# Patient Record
Sex: MALE | Race: WHITE | NOT HISPANIC OR LATINO | Employment: OTHER | URBAN - METROPOLITAN AREA
[De-identification: names, ages, dates, MRNs, and addresses within clinical notes are randomized per-mention and may not be internally consistent; named-entity substitution may affect disease eponyms.]

---

## 2020-06-17 ENCOUNTER — HISTORICAL (OUTPATIENT)
Dept: RADIOLOGY | Facility: HOSPITAL | Age: 65
End: 2020-06-17

## 2023-07-11 NOTE — PROGRESS NOTES
[facility] Vascular - Clinic Note  Blanca Reddy MD      Patient Name: Mike Quintero                   : 1955      MRN: [id number]   Visit Date: 2023       History Present Illness     Reason for Visit: Aortic Aneurysm      Mr. Quintero presents to the clinic for 6 month follow for his abdominal aortic aneurysm. Abdominal duplex obtained today. He denies acute onset of abdominal or back pain. He has chronic low back pain that is unchanged. He denies claudication with ambulation.  No recent health changes or hospitalizations.      REVIEW OF SYSTEMS:  ROS  12 point review of systems conducted, negative except as stated in the history of present illness. See HPI for details.        Physical Exam      Visit Vitals  BP (!) 159/91 (BP Location: Right arm)   Pulse 71   Wt 77.1 kg (170 lb)         General: well-nourished, no acute distress, and healthy appearing, ambulating normally, alert, pleasant, conversant, and oriented thin, healthy appearing  Neurologic: cranial nerves are grossly intact, no neurologic deficits  HEENT: grossly normal and no scleral icterus  Neck/Chest: normal  and soft without lymphadenopathy  Respiratory: breathing easily and without respiratory distress  Abdomen: normal, soft, and palpable pulsatile mass  Cardiology: regular rate and rhythm    Upper Extremity Arterial Exam:   Right - radial is palpable  Left - radial is palpable    Lower Extremity Arterial Exam:  Right - posterior tibial is palpable and dorsalis pedis is non-palpable  Left - posterior tibial is palpable and dorsalis pedis is palpable    Musculoskeletal:   Lower Extremity: no edema present to bilateral lower extremities    Skin: has no obvious skin abnormality to upper extremities and lower extremities              Assessment and Plan     Mr. Quintero is a 67 y.o. with an abdominal aortic aneurysm which appears larger than previous visit..  He denies acute back or abdominal pain. His previous aorta duplex obtained  1/10/23 demonstrates a  4.8 cm AAA. Duplex obtained today reveals slight increase in size of his aneurysm measuring approximately 5.2 cm . Recommend obtaining CTA abdomen/pelvis to further evaluate the interval change in size of his aneurysm. Based on previous imaging he does appear to be suitable for endovascular aortic aneurysm repair. We briefly discussed options for repair if needed.         1. Infrarenal abdominal aortic aneurysm (AAA) without rupture  - CTA Abdomen and Pelvis; Future  - Creatinine, serum; Future           Imaging Obtained/Reviewed   Study: aorta duplex  Date:   23        Medical History     Past Medical History:   Diagnosis Date    Hypertension     Larynx cancer      Past Surgical History:   Procedure Laterality Date    WRIST SURGERY Right      Family History   Family history unknown: Yes     Social History     Socioeconomic History    Marital status:    Tobacco Use    Smoking status: Former     Types: Cigarettes     Quit date:      Years since quittin.5    Smokeless tobacco: Never     Current Outpatient Medications   Medication Instructions    ascorbic acid (vitamin C) (VITAMIN C) 1,000 mg, Oral, Daily    zinc sulfate (ZINC-220) 220 mg, Oral, Daily     Review of patient's allergies indicates:  No Known Allergies    Patient Care Team:  Srinivas Moore MD as PCP - General (Family Medicine)  Art Mims MD as Consulting Physician (Cardiovascular Disease)        No follow-ups on file. In addition to their scheduled follow up, the patient has also been instructed to follow up on as needed basis.     Future Appointments   Date Time Provider Department Center   2023  8:00 AM [facility] CT1 500 LB LIMIT [facility]   2023  9:00 AM Blanca Redyd MD [facility]

## 2023-07-18 ENCOUNTER — OFFICE VISIT (OUTPATIENT)
Dept: VASCULAR SURGERY | Facility: CLINIC | Age: 68
End: 2023-07-18
Payer: COMMERCIAL

## 2023-07-18 VITALS — SYSTOLIC BLOOD PRESSURE: 159 MMHG | HEART RATE: 71 BPM | DIASTOLIC BLOOD PRESSURE: 91 MMHG | WEIGHT: 170 LBS

## 2023-07-18 DIAGNOSIS — I71.43 INFRARENAL ABDOMINAL AORTIC ANEURYSM (AAA) WITHOUT RUPTURE: Primary | ICD-10-CM

## 2023-07-18 PROCEDURE — 1159F PR MEDICATION LIST DOCUMENTED IN MEDICAL RECORD: ICD-10-PCS | Mod: CPTII,,, | Performed by: SPECIALIST

## 2023-07-18 PROCEDURE — 1101F PT FALLS ASSESS-DOCD LE1/YR: CPT | Mod: CPTII,,, | Performed by: SPECIALIST

## 2023-07-18 PROCEDURE — 3288F FALL RISK ASSESSMENT DOCD: CPT | Mod: CPTII,,, | Performed by: SPECIALIST

## 2023-07-18 PROCEDURE — 1101F PR PT FALLS ASSESS DOC 0-1 FALLS W/OUT INJ PAST YR: ICD-10-PCS | Mod: CPTII,,, | Performed by: SPECIALIST

## 2023-07-18 PROCEDURE — 99213 PR OFFICE/OUTPT VISIT, EST, LEVL III, 20-29 MIN: ICD-10-PCS | Mod: ,,, | Performed by: SPECIALIST

## 2023-07-18 PROCEDURE — 1159F MED LIST DOCD IN RCRD: CPT | Mod: CPTII,,, | Performed by: SPECIALIST

## 2023-07-18 PROCEDURE — 99213 OFFICE O/P EST LOW 20 MIN: CPT | Mod: ,,, | Performed by: SPECIALIST

## 2023-07-18 PROCEDURE — 3288F PR FALLS RISK ASSESSMENT DOCUMENTED: ICD-10-PCS | Mod: CPTII,,, | Performed by: SPECIALIST

## 2023-07-18 PROCEDURE — 3080F PR MOST RECENT DIASTOLIC BLOOD PRESSURE >= 90 MM HG: ICD-10-PCS | Mod: CPTII,,, | Performed by: SPECIALIST

## 2023-07-18 PROCEDURE — 1160F PR REVIEW ALL MEDS BY PRESCRIBER/CLIN PHARMACIST DOCUMENTED: ICD-10-PCS | Mod: CPTII,,, | Performed by: SPECIALIST

## 2023-07-18 PROCEDURE — 3077F PR MOST RECENT SYSTOLIC BLOOD PRESSURE >= 140 MM HG: ICD-10-PCS | Mod: CPTII,,, | Performed by: SPECIALIST

## 2023-07-18 PROCEDURE — 3080F DIAST BP >= 90 MM HG: CPT | Mod: CPTII,,, | Performed by: SPECIALIST

## 2023-07-18 PROCEDURE — 3077F SYST BP >= 140 MM HG: CPT | Mod: CPTII,,, | Performed by: SPECIALIST

## 2023-07-18 PROCEDURE — 1160F RVW MEDS BY RX/DR IN RCRD: CPT | Mod: CPTII,,, | Performed by: SPECIALIST

## 2023-07-18 RX ORDER — ZINC SULFATE 50(220)MG
220 CAPSULE ORAL DAILY
COMMUNITY

## 2023-07-18 RX ORDER — IBUPROFEN 100 MG/5ML
1000 SUSPENSION, ORAL (FINAL DOSE FORM) ORAL DAILY
COMMUNITY

## 2023-07-31 NOTE — PROGRESS NOTES
[facility] Vascular - Clinic Note  Blanca Reddy MD      Patient Name: Mike Quintero                   : 1955      MRN: [id number]   Visit Date: 2023       History Present Illness     Reason for Visit: Aortic Aneurysm      Mr. Quintero presents to the clinic for 2 week follow to discuss recent CTA images secondary to increase size of his abdominal aortic aneurysm on recent ultrasound. He continues to deny acute onset of abdominal or back pain. He has chronic low back pain that is unchanged. He denies claudication with ambulation.  No recent health changes or hospitalizations.   He reports some issues with a right sided radiculopathy. Denies previous abdominal surgery.       REVIEW OF SYSTEMS:  ROS  12 point review of systems conducted, negative except as stated in the history of present illness. See HPI for details.        Physical Exam      Visit Vitals  BP (!) 155/88 (BP Location: Right arm)   Pulse 87           General: well-nourished, no acute distress, and healthy appearing, ambulating normally, alert, pleasant, conversant, and oriented thin, healthy appearing  Neurologic: cranial nerves are grossly intact, no neurologic deficits  HEENT: grossly normal and no scleral icterus  Neck/Chest: normal  and soft without lymphadenopathy  Respiratory: breathing easily and without respiratory distress  Abdomen: normal, soft, and palpable pulsatile mass  Cardiology: regular rate and rhythm    Upper Extremity Arterial Exam:   Right - radial is palpable  Left - radial is palpable    Lower Extremity Arterial Exam:  Right - posterior tibial is palpable and dorsalis pedis is non-palpable  Left - posterior tibial is palpable and dorsalis pedis is palpable    Musculoskeletal:   Lower Extremity: no edema present to bilateral lower extremities    Skin: has no obvious skin abnormality to upper extremities and lower extremities              Assessment and Plan     Mr. Quintero is a 67 y.o. with a recent increase in  size of known abdominal aortic aneurysm on ultrasound testing. He denies acute back or abdominal pain.I personally reviewed the images from his recent CTA abdomen/pelvis obtained 23, which demonstrates an approximately 5.5 cm infrarenal AAA on AP measurement on transverse cuts (5.3 cm measured  on centerline) . I recommend proceeding with ENDOVASCULAR AORTIC ANEURYSM REPAIR. We discussed, in detail, surgical options as well as the risks and benefits of the procedure including myocardial infarction, bleeding, infection, renal failure, arterial injury, limb ischemia, conversion to open repair, and/or death. We discussed the risk of need for further procedures associated with endoleak. He wishes to proceed.        1. Infrarenal abdominal aortic aneurysm (AAA) without rupture             Imaging Obtained/Reviewed   Study: aorta duplex  Date:   23        Medical History     Past Medical History:   Diagnosis Date    Hypertension     Larynx cancer      Past Surgical History:   Procedure Laterality Date    WRIST SURGERY Right      Family History   Family history unknown: Yes     Social History     Socioeconomic History    Marital status:    Tobacco Use    Smoking status: Former     Current packs/day: 0.00     Types: Cigarettes     Quit date:      Years since quittin.6    Smokeless tobacco: Never     Current Outpatient Medications   Medication Instructions    ascorbic acid (vitamin C) (VITAMIN C) 1,000 mg, Oral, Daily    zinc sulfate (ZINC-220) 220 mg, Oral, Daily     Review of patient's allergies indicates:  No Known Allergies    Patient Care Team:  Srinivas Moore MD as PCP - General (Family Medicine)  Art Mims MD as Consulting Physician (Cardiovascular Disease)        No follow-ups on file. In addition to their scheduled follow up, the patient has also been instructed to follow up on as needed basis.     No future appointments.

## 2023-08-01 ENCOUNTER — OFFICE VISIT (OUTPATIENT)
Dept: VASCULAR SURGERY | Facility: CLINIC | Age: 68
End: 2023-08-01
Payer: COMMERCIAL

## 2023-08-01 ENCOUNTER — HOSPITAL ENCOUNTER (OUTPATIENT)
Dept: RADIOLOGY | Facility: HOSPITAL | Age: 68
Discharge: HOME | End: 2023-08-01
Attending: SPECIALIST
Payer: COMMERCIAL

## 2023-08-01 VITALS — SYSTOLIC BLOOD PRESSURE: 155 MMHG | HEART RATE: 87 BPM | DIASTOLIC BLOOD PRESSURE: 88 MMHG

## 2023-08-01 DIAGNOSIS — I71.43 INFRARENAL ABDOMINAL AORTIC ANEURYSM (AAA) WITHOUT RUPTURE: ICD-10-CM

## 2023-08-01 DIAGNOSIS — I71.43 INFRARENAL ABDOMINAL AORTIC ANEURYSM (AAA) WITHOUT RUPTURE: Primary | ICD-10-CM

## 2023-08-01 PROCEDURE — 3079F DIAST BP 80-89 MM HG: CPT | Mod: CPTII,,, | Performed by: SPECIALIST

## 2023-08-01 PROCEDURE — 3079F PR MOST RECENT DIASTOLIC BLOOD PRESSURE 80-89 MM HG: ICD-10-PCS | Mod: CPTII,,, | Performed by: SPECIALIST

## 2023-08-01 PROCEDURE — 99214 PR OFFICE/OUTPT VISIT, EST, LEVL IV, 30-39 MIN: ICD-10-PCS | Mod: ,,, | Performed by: SPECIALIST

## 2023-08-01 PROCEDURE — 1101F PT FALLS ASSESS-DOCD LE1/YR: CPT | Mod: CPTII,,, | Performed by: SPECIALIST

## 2023-08-01 PROCEDURE — 1159F PR MEDICATION LIST DOCUMENTED IN MEDICAL RECORD: ICD-10-PCS | Mod: CPTII,,, | Performed by: SPECIALIST

## 2023-08-01 PROCEDURE — 99214 OFFICE O/P EST MOD 30 MIN: CPT | Mod: ,,, | Performed by: SPECIALIST

## 2023-08-01 PROCEDURE — 1101F PR PT FALLS ASSESS DOC 0-1 FALLS W/OUT INJ PAST YR: ICD-10-PCS | Mod: CPTII,,, | Performed by: SPECIALIST

## 2023-08-01 PROCEDURE — 3077F SYST BP >= 140 MM HG: CPT | Mod: CPTII,,, | Performed by: SPECIALIST

## 2023-08-01 PROCEDURE — 1160F PR REVIEW ALL MEDS BY PRESCRIBER/CLIN PHARMACIST DOCUMENTED: ICD-10-PCS | Mod: CPTII,,, | Performed by: SPECIALIST

## 2023-08-01 PROCEDURE — 3077F PR MOST RECENT SYSTOLIC BLOOD PRESSURE >= 140 MM HG: ICD-10-PCS | Mod: CPTII,,, | Performed by: SPECIALIST

## 2023-08-01 PROCEDURE — 3288F PR FALLS RISK ASSESSMENT DOCUMENTED: ICD-10-PCS | Mod: CPTII,,, | Performed by: SPECIALIST

## 2023-08-01 PROCEDURE — 3288F FALL RISK ASSESSMENT DOCD: CPT | Mod: CPTII,,, | Performed by: SPECIALIST

## 2023-08-01 PROCEDURE — 1160F RVW MEDS BY RX/DR IN RCRD: CPT | Mod: CPTII,,, | Performed by: SPECIALIST

## 2023-08-01 PROCEDURE — 71046 X-RAY EXAM CHEST 2 VIEWS: CPT | Mod: TC

## 2023-08-01 PROCEDURE — 1159F MED LIST DOCD IN RCRD: CPT | Mod: CPTII,,, | Performed by: SPECIALIST

## 2023-08-02 RX ORDER — MUPIROCIN 20 MG/G
OINTMENT TOPICAL
Status: CANCELLED | OUTPATIENT
Start: 2023-08-02

## 2023-08-02 RX ORDER — LIDOCAINE HYDROCHLORIDE 10 MG/ML
1 INJECTION, SOLUTION EPIDURAL; INFILTRATION; INTRACAUDAL; PERINEURAL ONCE
Status: CANCELLED | OUTPATIENT
Start: 2023-08-02 | End: 2023-08-02

## 2023-08-02 RX ORDER — SODIUM CHLORIDE 9 MG/ML
INJECTION, SOLUTION INTRAVENOUS CONTINUOUS
Status: CANCELLED | OUTPATIENT
Start: 2023-08-02

## 2023-08-03 RX ORDER — HYDROCODONE BITARTRATE AND ACETAMINOPHEN 7.5; 325 MG/1; MG/1
1 TABLET ORAL EVERY 6 HOURS PRN
Qty: 28 TABLET | Refills: 0 | Status: SHIPPED | OUTPATIENT
Start: 2023-08-03 | End: 2023-09-05

## 2023-08-18 ENCOUNTER — TELEPHONE (OUTPATIENT)
Dept: VASCULAR SURGERY | Facility: CLINIC | Age: 68
End: 2023-08-18
Payer: COMMERCIAL

## 2023-08-18 NOTE — TELEPHONE ENCOUNTER
Mr. Quintero did undergo PET stress testing earlier today.  I did speak to Dr. Mims and there was significant concern from that test.  We will cancel his EVAR which was scheduled for this Monday and he will proceed with coronary angiogram with Dr. Mims.  We will await those findings and determine suitable time for future aneurysm repair.

## 2023-08-24 ENCOUNTER — HOSPITAL ENCOUNTER (OUTPATIENT)
Facility: HOSPITAL | Age: 68
Discharge: HOME | End: 2023-08-24
Attending: INTERNAL MEDICINE | Admitting: INTERNAL MEDICINE
Payer: COMMERCIAL

## 2023-08-24 VITALS
RESPIRATION RATE: 15 BRPM | OXYGEN SATURATION: 97 % | HEART RATE: 55 BPM | WEIGHT: 172.38 LBS | SYSTOLIC BLOOD PRESSURE: 123 MMHG | HEIGHT: 70 IN | BODY MASS INDEX: 24.68 KG/M2 | DIASTOLIC BLOOD PRESSURE: 78 MMHG | TEMPERATURE: 98 F

## 2023-08-24 DIAGNOSIS — R94.8 ABNORMAL PET SCAN, MEDIASTINUM: ICD-10-CM

## 2023-08-24 PROCEDURE — 25500020 PHARM REV CODE 255: Performed by: INTERNAL MEDICINE

## 2023-08-24 PROCEDURE — C1769 GUIDE WIRE: HCPCS | Performed by: INTERNAL MEDICINE

## 2023-08-24 PROCEDURE — 27201423 OPTIME MED/SURG SUP & DEVICES STERILE SUPPLY: Performed by: INTERNAL MEDICINE

## 2023-08-24 PROCEDURE — 99152 MOD SED SAME PHYS/QHP 5/>YRS: CPT | Performed by: INTERNAL MEDICINE

## 2023-08-24 PROCEDURE — C1887 CATHETER, GUIDING: HCPCS | Performed by: INTERNAL MEDICINE

## 2023-08-24 PROCEDURE — 93459 L HRT ART/GRFT ANGIO: CPT | Performed by: INTERNAL MEDICINE

## 2023-08-24 PROCEDURE — 63600175 PHARM REV CODE 636 W HCPCS: Performed by: INTERNAL MEDICINE

## 2023-08-24 PROCEDURE — 25000003 PHARM REV CODE 250: Performed by: INTERNAL MEDICINE

## 2023-08-24 RX ORDER — DIPHENHYDRAMINE HCL 25 MG
50 CAPSULE ORAL
Status: DISCONTINUED | OUTPATIENT
Start: 2023-08-24 | End: 2023-08-24 | Stop reason: HOSPADM

## 2023-08-24 RX ORDER — FENTANYL CITRATE 50 UG/ML
INJECTION, SOLUTION INTRAMUSCULAR; INTRAVENOUS
Status: DISCONTINUED | OUTPATIENT
Start: 2023-08-24 | End: 2023-08-24 | Stop reason: HOSPADM

## 2023-08-24 RX ORDER — NAPROXEN SODIUM 220 MG/1
81 TABLET, FILM COATED ORAL DAILY
COMMUNITY

## 2023-08-24 RX ORDER — DIAZEPAM 5 MG/1
10 TABLET ORAL
Status: DISCONTINUED | OUTPATIENT
Start: 2023-08-24 | End: 2023-08-24 | Stop reason: HOSPADM

## 2023-08-24 RX ORDER — VERAPAMIL HYDROCHLORIDE 2.5 MG/ML
INJECTION, SOLUTION INTRAVENOUS
Status: DISCONTINUED | OUTPATIENT
Start: 2023-08-24 | End: 2023-08-24 | Stop reason: HOSPADM

## 2023-08-24 RX ORDER — LIDOCAINE HYDROCHLORIDE 10 MG/ML
INJECTION, SOLUTION EPIDURAL; INFILTRATION; INTRACAUDAL; PERINEURAL
Status: DISCONTINUED | OUTPATIENT
Start: 2023-08-24 | End: 2023-08-24 | Stop reason: HOSPADM

## 2023-08-24 RX ORDER — HEPARIN SODIUM 1000 [USP'U]/ML
INJECTION, SOLUTION INTRAVENOUS; SUBCUTANEOUS
Status: DISCONTINUED | OUTPATIENT
Start: 2023-08-24 | End: 2023-08-24 | Stop reason: HOSPADM

## 2023-08-24 RX ORDER — MIDAZOLAM HYDROCHLORIDE 1 MG/ML
INJECTION INTRAMUSCULAR; INTRAVENOUS
Status: DISCONTINUED | OUTPATIENT
Start: 2023-08-24 | End: 2023-08-24 | Stop reason: HOSPADM

## 2023-08-24 RX ORDER — NITROGLYCERIN 5 MG/ML
INJECTION, SOLUTION INTRAVENOUS
Status: DISCONTINUED | OUTPATIENT
Start: 2023-08-24 | End: 2023-08-24 | Stop reason: HOSPADM

## 2023-08-24 RX ORDER — SODIUM CHLORIDE 9 MG/ML
INJECTION, SOLUTION INTRAVENOUS ONCE
Status: COMPLETED | OUTPATIENT
Start: 2023-08-24 | End: 2023-08-24

## 2023-08-24 RX ADMIN — SODIUM CHLORIDE: 9 INJECTION, SOLUTION INTRAVENOUS at 05:08

## 2023-08-24 RX ADMIN — DIPHENHYDRAMINE HYDROCHLORIDE 50 MG: 25 CAPSULE ORAL at 05:08

## 2023-08-24 RX ADMIN — DIAZEPAM 10 MG: 5 TABLET ORAL at 05:08

## 2023-08-24 NOTE — DISCHARGE INSTRUCTIONS
- Remove dressing in 24hrs  - No driving for two Days  - Do not lift anything heavier than a gallon of milk for 5 days  - No lotions, powders, creams around site for 5 days  - Return to the nearest emergency room if you start running a fever; have any kind of discharge coming from the site, the site looks red or   swollen.  - If site starts to bleed, lay flat on the ground, apply pressure to the site and call 911.

## 2023-08-24 NOTE — Clinical Note
The catheter was inserted into the left subclavian artery. An angiography was performed of the LIMA. Multiple views were taken. The angiography was performed via power injection.

## 2023-08-24 NOTE — INTERVAL H&P NOTE
Patient name: Mike Quintero  MRN: [id number]  : 1955  Cath Lab Procedure H&P Update    Pre-Procedure Assessment:    I saw and examined the patient face to face. The patient has been re-evaluated and his condition is unchanged. The reason for admission, procedure and care is still present.  Based on the patients H&P, pre-procedure physical exam, relevant diagnostic studies, NPO status and information obtained from the patient, I determine the patient is an appropriate candidate for the proposed procedure and anesthesia planned. I further certify the anesthesia risks, benefits and options have been explained to the patient to which he agrees as documented on the procedural consent.

## 2023-08-24 NOTE — Clinical Note
The catheter was inserted into the left ventricle. Hemodynamics were performed.  and Pullback was recorded.  An angiography was performed of the LV. The angiography was performed via power injection.  EF is 60%

## 2023-08-24 NOTE — Clinical Note
The catheter was inserted into the aorta. The catheter was unable to engage the area..Unable to engage LIMA

## 2023-08-25 DIAGNOSIS — I71.43 INFRARENAL ABDOMINAL AORTIC ANEURYSM (AAA) WITHOUT RUPTURE: Primary | ICD-10-CM

## 2023-08-25 DIAGNOSIS — I71.43 ANEURYSM OF INFRARENAL ABDOMINAL AORTA: ICD-10-CM

## 2023-08-25 RX ORDER — SODIUM CHLORIDE 9 MG/ML
INJECTION, SOLUTION INTRAVENOUS CONTINUOUS
Status: CANCELLED | OUTPATIENT
Start: 2023-09-20

## 2023-08-29 NOTE — PROGRESS NOTES
"    [facility] Vascular - Clinic Note  Blanca Reddy MD      Patient Name: Mike Quintero                   : 1955      MRN: [id number]   Visit Date: 2023       History Present Illness     Reason for Visit: Aortic Aneurysm       Mr. Quintero presents to the clinic in preparation for endovascular aortic aneurysm repair. He was previously postponed for cardiac workup/risk assessment. Evaluation and recommendations have been completed and he has been advised to move forward with aneurysm repair at this time. He denies abdominal and back pain. Denies chest pain with limitation with ambulation.       REVIEW OF SYSTEMS:  ROS  12 point review of systems conducted, negative except as stated in the history of present illness. See HPI for details.        Physical Exam      Visit Vitals  BP (!) 163/88 (BP Location: Left arm)   Pulse 66   Ht 5' 10" (1.778 m)   Wt 77.1 kg (170 lb)   BMI 24.39 kg/m²         General: well-nourished, no acute distress, and healthy appearing, ambulating normally, alert, pleasant, conversant, and oriented  Neurologic: cranial nerves are grossly intact, no neurologic deficits  HEENT: grossly normal and no scleral icterus  Neck/Chest: normal  and soft without lymphadenopathy  Respiratory: breathing easily and without respiratory distress  Abdomen: normal and soft  Cardiology: regular rate and rhythm    Upper Extremity Arterial Exam:   Right - radial is palpable  Left - radial is palpable    Lower Extremity Arterial Exam:  Right - posterior tibial is palpable  Left - posterior tibial is palpable    Musculoskeletal:   Lower Extremity: no edema present to bilateral lower extremities            Assessment and Plan     Mr. Quintero is a 67 y.o.  with an infrarenal abdominal aortic aneurysm.  As he has been cleared by cardiology, we will proceed with ENDOVASCULAR AORTIC ANEURYSM REPAIR.   We previously discussed his risks and benefits and reviewed the procedural steps and possible therapeutic " interventions (possible open repair).  We emphasized that he is still at some increased risk for lainey-procedural MI.  He has no intention of undergoing CABG in the near future but understands that it has been recommended by cardiology.  He has no chest pain and can ambulate over a mile without difficulty.           1. Infrarenal abdominal aortic aneurysm (AAA) without rupture  - Basic Metabolic Panel; Future  - CBC Auto Differential; Future  - EKG 12-lead; Future  - X-Ray Chest PA And Lateral Pre-OP; Future           Imaging Obtained/Reviewed   Study: CTA abdomen/pelvis  Date:   23        Medical History     Past Medical History:   Diagnosis Date    Arthritis     Hypertension     Infrarenal abdominal aortic aneurysm (AAA) without rupture     Larynx cancer      Past Surgical History:   Procedure Laterality Date    COLONOSCOPY      LEFT HEART CATHETERIZATION Left 2023    Procedure: Left heart cath;  Surgeon: Art Mims MD;  Location: [facility] CATH LAB;  Service: Cardiology;  Laterality: Left;  LHC    WRIST SURGERY Right      Family History   Family history unknown: Yes     Social History     Socioeconomic History    Marital status:    Tobacco Use    Smoking status: Former     Current packs/day: 0.00     Types: Cigarettes     Quit date:      Years since quittin.7    Smokeless tobacco: Never   Substance and Sexual Activity    Alcohol use: Not Currently    Drug use: Never     Current Outpatient Medications   Medication Instructions    ascorbic acid (vitamin C) (VITAMIN C) 1,000 mg, Oral, Daily    aspirin 81 mg, Oral, Daily    QUERCETIN ORAL 1 tablet, Oral, Daily    zinc sulfate (ZINC-220) 220 mg, Oral, Daily     Review of patient's allergies indicates:  No Known Allergies    Patient Care Team:  Srinivas Moore MD as PCP - General (Family Medicine)  Art Mims MD as Consulting Physician (Cardiovascular Disease)        No follow-ups on file. In addition to their scheduled  follow up, the patient has also been instructed to follow up on as needed basis.     No future appointments.

## 2023-08-29 NOTE — H&P (VIEW-ONLY)
"    [facility] Vascular - Clinic Note  Blanca Reddy MD      Patient Name: Mike Quintero                   : 1955      MRN: [id number]   Visit Date: 2023       History Present Illness     Reason for Visit: Aortic Aneurysm       Mr. Quintero presents to the clinic in preparation for endovascular aortic aneurysm repair. He was previously postponed for cardiac workup/risk assessment. Evaluation and recommendations have been completed and he has been advised to move forward with aneurysm repair at this time. He denies abdominal and back pain. Denies chest pain with limitation with ambulation.       REVIEW OF SYSTEMS:  ROS  12 point review of systems conducted, negative except as stated in the history of present illness. See HPI for details.        Physical Exam      Visit Vitals  BP (!) 163/88 (BP Location: Left arm)   Pulse 66   Ht 5' 10" (1.778 m)   Wt 77.1 kg (170 lb)   BMI 24.39 kg/m²         General: well-nourished, no acute distress, and healthy appearing, ambulating normally, alert, pleasant, conversant, and oriented  Neurologic: cranial nerves are grossly intact, no neurologic deficits  HEENT: grossly normal and no scleral icterus  Neck/Chest: normal  and soft without lymphadenopathy  Respiratory: breathing easily and without respiratory distress  Abdomen: normal and soft  Cardiology: regular rate and rhythm    Upper Extremity Arterial Exam:   Right - radial is palpable  Left - radial is palpable    Lower Extremity Arterial Exam:  Right - posterior tibial is palpable  Left - posterior tibial is palpable    Musculoskeletal:   Lower Extremity: no edema present to bilateral lower extremities            Assessment and Plan     Mr. Quintero is a 67 y.o.  with an infrarenal abdominal aortic aneurysm.  As he has been cleared by cardiology, we will proceed with ENDOVASCULAR AORTIC ANEURYSM REPAIR.   We previously discussed his risks and benefits and reviewed the procedural steps and possible therapeutic " interventions (possible open repair).  We emphasized that he is still at some increased risk for lainey-procedural MI.  He has no intention of undergoing CABG in the near future but understands that it has been recommended by cardiology.  He has no chest pain and can ambulate over a mile without difficulty.           1. Infrarenal abdominal aortic aneurysm (AAA) without rupture  - Basic Metabolic Panel; Future  - CBC Auto Differential; Future  - EKG 12-lead; Future  - X-Ray Chest PA And Lateral Pre-OP; Future           Imaging Obtained/Reviewed   Study: CTA abdomen/pelvis  Date:   23        Medical History     Past Medical History:   Diagnosis Date    Arthritis     Hypertension     Infrarenal abdominal aortic aneurysm (AAA) without rupture     Larynx cancer      Past Surgical History:   Procedure Laterality Date    COLONOSCOPY      LEFT HEART CATHETERIZATION Left 2023    Procedure: Left heart cath;  Surgeon: Art Mims MD;  Location: [facility] CATH LAB;  Service: Cardiology;  Laterality: Left;  LHC    WRIST SURGERY Right      Family History   Family history unknown: Yes     Social History     Socioeconomic History    Marital status:    Tobacco Use    Smoking status: Former     Current packs/day: 0.00     Types: Cigarettes     Quit date:      Years since quittin.7    Smokeless tobacco: Never   Substance and Sexual Activity    Alcohol use: Not Currently    Drug use: Never     Current Outpatient Medications   Medication Instructions    ascorbic acid (vitamin C) (VITAMIN C) 1,000 mg, Oral, Daily    aspirin 81 mg, Oral, Daily    QUERCETIN ORAL 1 tablet, Oral, Daily    zinc sulfate (ZINC-220) 220 mg, Oral, Daily     Review of patient's allergies indicates:  No Known Allergies    Patient Care Team:  Srinivas Moore MD as PCP - General (Family Medicine)  Art Mims MD as Consulting Physician (Cardiovascular Disease)        No follow-ups on file. In addition to their scheduled  follow up, the patient has also been instructed to follow up on as needed basis.     No future appointments.

## 2023-09-05 ENCOUNTER — OFFICE VISIT (OUTPATIENT)
Dept: VASCULAR SURGERY | Facility: CLINIC | Age: 68
End: 2023-09-05
Payer: COMMERCIAL

## 2023-09-05 VITALS
DIASTOLIC BLOOD PRESSURE: 88 MMHG | HEART RATE: 66 BPM | HEIGHT: 70 IN | WEIGHT: 170 LBS | BODY MASS INDEX: 24.34 KG/M2 | SYSTOLIC BLOOD PRESSURE: 163 MMHG

## 2023-09-05 DIAGNOSIS — I71.43 INFRARENAL ABDOMINAL AORTIC ANEURYSM (AAA) WITHOUT RUPTURE: Primary | ICD-10-CM

## 2023-09-05 PROCEDURE — 1101F PR PT FALLS ASSESS DOC 0-1 FALLS W/OUT INJ PAST YR: ICD-10-PCS | Mod: CPTII,,, | Performed by: SPECIALIST

## 2023-09-05 PROCEDURE — 3288F FALL RISK ASSESSMENT DOCD: CPT | Mod: CPTII,,, | Performed by: SPECIALIST

## 2023-09-05 PROCEDURE — 3077F PR MOST RECENT SYSTOLIC BLOOD PRESSURE >= 140 MM HG: ICD-10-PCS | Mod: CPTII,,, | Performed by: SPECIALIST

## 2023-09-05 PROCEDURE — 3079F DIAST BP 80-89 MM HG: CPT | Mod: CPTII,,, | Performed by: SPECIALIST

## 2023-09-05 PROCEDURE — 99212 OFFICE O/P EST SF 10 MIN: CPT | Mod: ,,, | Performed by: SPECIALIST

## 2023-09-05 PROCEDURE — 1160F RVW MEDS BY RX/DR IN RCRD: CPT | Mod: CPTII,,, | Performed by: SPECIALIST

## 2023-09-05 PROCEDURE — 3008F BODY MASS INDEX DOCD: CPT | Mod: CPTII,,, | Performed by: SPECIALIST

## 2023-09-05 PROCEDURE — 3288F PR FALLS RISK ASSESSMENT DOCUMENTED: ICD-10-PCS | Mod: CPTII,,, | Performed by: SPECIALIST

## 2023-09-05 PROCEDURE — 3079F PR MOST RECENT DIASTOLIC BLOOD PRESSURE 80-89 MM HG: ICD-10-PCS | Mod: CPTII,,, | Performed by: SPECIALIST

## 2023-09-05 PROCEDURE — 1159F PR MEDICATION LIST DOCUMENTED IN MEDICAL RECORD: ICD-10-PCS | Mod: CPTII,,, | Performed by: SPECIALIST

## 2023-09-05 PROCEDURE — 1101F PT FALLS ASSESS-DOCD LE1/YR: CPT | Mod: CPTII,,, | Performed by: SPECIALIST

## 2023-09-05 PROCEDURE — 3008F PR BODY MASS INDEX (BMI) DOCUMENTED: ICD-10-PCS | Mod: CPTII,,, | Performed by: SPECIALIST

## 2023-09-05 PROCEDURE — 1159F MED LIST DOCD IN RCRD: CPT | Mod: CPTII,,, | Performed by: SPECIALIST

## 2023-09-05 PROCEDURE — 1160F PR REVIEW ALL MEDS BY PRESCRIBER/CLIN PHARMACIST DOCUMENTED: ICD-10-PCS | Mod: CPTII,,, | Performed by: SPECIALIST

## 2023-09-05 PROCEDURE — 99212 PR OFFICE/OUTPT VISIT, EST, LEVL II, 10-19 MIN: ICD-10-PCS | Mod: ,,, | Performed by: SPECIALIST

## 2023-09-05 PROCEDURE — 3077F SYST BP >= 140 MM HG: CPT | Mod: CPTII,,, | Performed by: SPECIALIST

## 2023-09-13 ENCOUNTER — HOSPITAL ENCOUNTER (OUTPATIENT)
Dept: RADIOLOGY | Facility: HOSPITAL | Age: 68
Discharge: HOME | End: 2023-09-13
Attending: SPECIALIST
Payer: COMMERCIAL

## 2023-09-13 ENCOUNTER — ANESTHESIA EVENT (OUTPATIENT)
Dept: SURGERY | Facility: HOSPITAL | Age: 68
DRG: 269 | End: 2023-09-13
Payer: COMMERCIAL

## 2023-09-13 DIAGNOSIS — I71.43 INFRARENAL ABDOMINAL AORTIC ANEURYSM (AAA) WITHOUT RUPTURE: ICD-10-CM

## 2023-09-13 PROCEDURE — 71046 X-RAY EXAM CHEST 2 VIEWS: CPT | Mod: TC

## 2023-09-20 ENCOUNTER — ANESTHESIA (OUTPATIENT)
Dept: SURGERY | Facility: HOSPITAL | Age: 68
DRG: 269 | End: 2023-09-20
Payer: COMMERCIAL

## 2023-09-20 ENCOUNTER — HOSPITAL ENCOUNTER (INPATIENT)
Facility: HOSPITAL | Age: 68
LOS: 1 days | Discharge: HOME | DRG: 269 | End: 2023-09-21
Attending: SPECIALIST | Admitting: SPECIALIST
Payer: COMMERCIAL

## 2023-09-20 DIAGNOSIS — I71.43 INFRARENAL ABDOMINAL AORTIC ANEURYSM (AAA) WITHOUT RUPTURE: ICD-10-CM

## 2023-09-20 LAB
ABORH RETYPE: NORMAL
GROUP & RH: NORMAL
INDIRECT COOMBS GEL: NORMAL
SPECIMEN OUTDATE: NORMAL

## 2023-09-20 PROCEDURE — 36000712 HC OR TIME LEV V 1ST 15 MIN: Performed by: SPECIALIST

## 2023-09-20 PROCEDURE — D9220A PRA ANESTHESIA: ICD-10-PCS | Mod: ANES,,, | Performed by: ANESTHESIOLOGY

## 2023-09-20 PROCEDURE — 34713 PERQ ACCESS & CLSR FEM ART: CPT | Mod: 50,,, | Performed by: SPECIALIST

## 2023-09-20 PROCEDURE — 76937 US GUIDE VASCULAR ACCESS: CPT | Performed by: ANESTHESIOLOGY

## 2023-09-20 PROCEDURE — 25000003 PHARM REV CODE 250: Performed by: SPECIALIST

## 2023-09-20 PROCEDURE — C1894 INTRO/SHEATH, NON-LASER: HCPCS | Performed by: SPECIALIST

## 2023-09-20 PROCEDURE — 34705 PR REPAIR, ENDOVASC, AORTO-BI-ILIAC ENDOGRAFT: ICD-10-PCS | Mod: ,,, | Performed by: SPECIALIST

## 2023-09-20 PROCEDURE — 76937 ARTERIAL: ICD-10-PCS | Mod: 26,,, | Performed by: ANESTHESIOLOGY

## 2023-09-20 PROCEDURE — 86923 COMPATIBILITY TEST ELECTRIC: CPT | Performed by: SPECIALIST

## 2023-09-20 PROCEDURE — 21400001 HC TELEMETRY ROOM

## 2023-09-20 PROCEDURE — 71000033 HC RECOVERY, INTIAL HOUR: Performed by: SPECIALIST

## 2023-09-20 PROCEDURE — 63600175 PHARM REV CODE 636 W HCPCS: Performed by: SPECIALIST

## 2023-09-20 PROCEDURE — 36000713 HC OR TIME LEV V EA ADD 15 MIN: Performed by: SPECIALIST

## 2023-09-20 PROCEDURE — 36620 INSERTION CATHETER ARTERY: CPT | Mod: 59,,, | Performed by: ANESTHESIOLOGY

## 2023-09-20 PROCEDURE — 63600175 PHARM REV CODE 636 W HCPCS: Performed by: REGISTERED NURSE

## 2023-09-20 PROCEDURE — 36620 ARTERIAL: ICD-10-PCS | Mod: 59,,, | Performed by: ANESTHESIOLOGY

## 2023-09-20 PROCEDURE — 94761 N-INVAS EAR/PLS OXIMETRY MLT: CPT

## 2023-09-20 PROCEDURE — D9220A PRA ANESTHESIA: Mod: ANES,,, | Performed by: ANESTHESIOLOGY

## 2023-09-20 PROCEDURE — 86900 BLOOD TYPING SEROLOGIC ABO: CPT | Performed by: SPECIALIST

## 2023-09-20 PROCEDURE — 63600175 PHARM REV CODE 636 W HCPCS: Performed by: ANESTHESIOLOGY

## 2023-09-20 PROCEDURE — 27201423 OPTIME MED/SURG SUP & DEVICES STERILE SUPPLY: Performed by: SPECIALIST

## 2023-09-20 PROCEDURE — D9220A PRA ANESTHESIA: Mod: CRNA,,, | Performed by: REGISTERED NURSE

## 2023-09-20 PROCEDURE — C2628 CATHETER, OCCLUSION: HCPCS | Performed by: SPECIALIST

## 2023-09-20 PROCEDURE — 25000003 PHARM REV CODE 250: Performed by: REGISTERED NURSE

## 2023-09-20 PROCEDURE — 34705 EVAC RPR A-BIILIAC NDGFT: CPT | Mod: ,,, | Performed by: SPECIALIST

## 2023-09-20 PROCEDURE — D9220A PRA ANESTHESIA: ICD-10-PCS | Mod: CRNA,,, | Performed by: REGISTERED NURSE

## 2023-09-20 PROCEDURE — C1769 GUIDE WIRE: HCPCS | Performed by: SPECIALIST

## 2023-09-20 PROCEDURE — C1760 CLOSURE DEV, VASC: HCPCS | Performed by: SPECIALIST

## 2023-09-20 PROCEDURE — 27800903 OPTIME MED/SURG SUP & DEVICES OTHER IMPLANTS: Performed by: SPECIALIST

## 2023-09-20 PROCEDURE — 34713 PR ACCESS/CLOSURE, FEM ART, DLVR OF ENDOGRAFT, PERC: ICD-10-PCS | Mod: 50,,, | Performed by: SPECIALIST

## 2023-09-20 PROCEDURE — 37000008 HC ANESTHESIA 1ST 15 MINUTES: Performed by: SPECIALIST

## 2023-09-20 PROCEDURE — 71000039 HC RECOVERY, EACH ADD'L HOUR: Performed by: SPECIALIST

## 2023-09-20 PROCEDURE — 37000009 HC ANESTHESIA EA ADD 15 MINS: Performed by: SPECIALIST

## 2023-09-20 PROCEDURE — 25500020 PHARM REV CODE 255: Performed by: SPECIALIST

## 2023-09-20 DEVICE — IMPLANTABLE DEVICE: Type: IMPLANTABLE DEVICE | Site: GROIN | Status: FUNCTIONAL

## 2023-09-20 RX ORDER — HEPARIN SODIUM 1000 [USP'U]/ML
INJECTION, SOLUTION INTRAVENOUS; SUBCUTANEOUS
Status: DISCONTINUED | OUTPATIENT
Start: 2023-09-20 | End: 2023-09-20 | Stop reason: HOSPADM

## 2023-09-20 RX ORDER — FENTANYL CITRATE 50 UG/ML
INJECTION, SOLUTION INTRAMUSCULAR; INTRAVENOUS
Status: DISCONTINUED | OUTPATIENT
Start: 2023-09-20 | End: 2023-09-20

## 2023-09-20 RX ORDER — HYDROCODONE BITARTRATE AND ACETAMINOPHEN 5; 325 MG/1; MG/1
1 TABLET ORAL EVERY 4 HOURS PRN
Status: DISCONTINUED | OUTPATIENT
Start: 2023-09-20 | End: 2023-09-21 | Stop reason: HOSPADM

## 2023-09-20 RX ORDER — MEPERIDINE HYDROCHLORIDE 25 MG/ML
12.5 INJECTION INTRAMUSCULAR; INTRAVENOUS; SUBCUTANEOUS ONCE AS NEEDED
Status: DISCONTINUED | OUTPATIENT
Start: 2023-09-20 | End: 2023-09-20 | Stop reason: HOSPADM

## 2023-09-20 RX ORDER — CEFAZOLIN SODIUM 2 G/50ML
2 SOLUTION INTRAVENOUS
Status: DISCONTINUED | OUTPATIENT
Start: 2023-09-20 | End: 2023-09-20

## 2023-09-20 RX ORDER — PHENYLEPHRINE HCL IN 0.9% NACL 1 MG/10 ML
SYRINGE (ML) INTRAVENOUS
Status: DISCONTINUED | OUTPATIENT
Start: 2023-09-20 | End: 2023-09-20

## 2023-09-20 RX ORDER — ONDANSETRON 2 MG/ML
4 INJECTION INTRAMUSCULAR; INTRAVENOUS EVERY 12 HOURS PRN
Status: DISCONTINUED | OUTPATIENT
Start: 2023-09-20 | End: 2023-09-21 | Stop reason: HOSPADM

## 2023-09-20 RX ORDER — PROPOFOL 10 MG/ML
VIAL (ML) INTRAVENOUS
Status: DISCONTINUED | OUTPATIENT
Start: 2023-09-20 | End: 2023-09-20

## 2023-09-20 RX ORDER — HYDROMORPHONE HYDROCHLORIDE 2 MG/ML
0.2 INJECTION, SOLUTION INTRAMUSCULAR; INTRAVENOUS; SUBCUTANEOUS EVERY 5 MIN PRN
Status: DISCONTINUED | OUTPATIENT
Start: 2023-09-20 | End: 2023-09-20 | Stop reason: HOSPADM

## 2023-09-20 RX ORDER — SODIUM CHLORIDE 9 MG/ML
INJECTION, SOLUTION INTRAVENOUS CONTINUOUS
Status: DISCONTINUED | OUTPATIENT
Start: 2023-09-20 | End: 2023-09-21 | Stop reason: HOSPADM

## 2023-09-20 RX ORDER — DEXAMETHASONE SODIUM PHOSPHATE 4 MG/ML
INJECTION, SOLUTION INTRA-ARTICULAR; INTRALESIONAL; INTRAMUSCULAR; INTRAVENOUS; SOFT TISSUE
Status: DISCONTINUED | OUTPATIENT
Start: 2023-09-20 | End: 2023-09-20

## 2023-09-20 RX ORDER — MIDAZOLAM HYDROCHLORIDE 1 MG/ML
INJECTION INTRAMUSCULAR; INTRAVENOUS
Status: DISCONTINUED | OUTPATIENT
Start: 2023-09-20 | End: 2023-09-20

## 2023-09-20 RX ORDER — MIDAZOLAM HYDROCHLORIDE 1 MG/ML
INJECTION INTRAMUSCULAR; INTRAVENOUS
Status: DISPENSED
Start: 2023-09-20 | End: 2023-09-20

## 2023-09-20 RX ORDER — ONDANSETRON 4 MG/1
4 TABLET, ORALLY DISINTEGRATING ORAL EVERY 6 HOURS PRN
Status: CANCELLED | OUTPATIENT
Start: 2023-09-20

## 2023-09-20 RX ORDER — SODIUM CITRATE AND CITRIC ACID MONOHYDRATE 334; 500 MG/5ML; MG/5ML
30 SOLUTION ORAL
Status: CANCELLED | OUTPATIENT
Start: 2023-09-20

## 2023-09-20 RX ORDER — ATORVASTATIN CALCIUM 40 MG/1
40 TABLET, FILM COATED ORAL DAILY
Status: DISCONTINUED | OUTPATIENT
Start: 2023-09-20 | End: 2023-09-21 | Stop reason: HOSPADM

## 2023-09-20 RX ORDER — DOCUSATE SODIUM 100 MG/1
100 CAPSULE, LIQUID FILLED ORAL 2 TIMES DAILY
Status: DISCONTINUED | OUTPATIENT
Start: 2023-09-20 | End: 2023-09-21 | Stop reason: HOSPADM

## 2023-09-20 RX ORDER — ACETAMINOPHEN 10 MG/ML
1000 INJECTION, SOLUTION INTRAVENOUS ONCE
Status: DISCONTINUED | OUTPATIENT
Start: 2023-09-20 | End: 2023-09-20 | Stop reason: HOSPADM

## 2023-09-20 RX ORDER — SODIUM CHLORIDE 9 MG/ML
INJECTION, SOLUTION INTRAVENOUS CONTINUOUS
Status: DISCONTINUED | OUTPATIENT
Start: 2023-09-20 | End: 2023-09-20

## 2023-09-20 RX ORDER — PROTAMINE SULFATE 10 MG/ML
INJECTION, SOLUTION INTRAVENOUS
Status: DISCONTINUED | OUTPATIENT
Start: 2023-09-20 | End: 2023-09-20

## 2023-09-20 RX ORDER — ROCURONIUM BROMIDE 10 MG/ML
INJECTION, SOLUTION INTRAVENOUS
Status: DISCONTINUED | OUTPATIENT
Start: 2023-09-20 | End: 2023-09-20

## 2023-09-20 RX ORDER — MIDAZOLAM HYDROCHLORIDE 1 MG/ML
2 INJECTION INTRAMUSCULAR; INTRAVENOUS ONCE
Status: COMPLETED | OUTPATIENT
Start: 2023-09-20 | End: 2023-09-20

## 2023-09-20 RX ORDER — EPHEDRINE SULFATE 50 MG/ML
INJECTION, SOLUTION INTRAVENOUS
Status: DISCONTINUED | OUTPATIENT
Start: 2023-09-20 | End: 2023-09-20

## 2023-09-20 RX ORDER — MUPIROCIN 20 MG/G
OINTMENT TOPICAL 2 TIMES DAILY
Status: DISCONTINUED | OUTPATIENT
Start: 2023-09-20 | End: 2023-09-21 | Stop reason: HOSPADM

## 2023-09-20 RX ORDER — HYDROMORPHONE HYDROCHLORIDE 2 MG/ML
0.5 INJECTION, SOLUTION INTRAMUSCULAR; INTRAVENOUS; SUBCUTANEOUS EVERY 4 HOURS PRN
Status: DISCONTINUED | OUTPATIENT
Start: 2023-09-20 | End: 2023-09-21 | Stop reason: HOSPADM

## 2023-09-20 RX ORDER — ONDANSETRON HYDROCHLORIDE 2 MG/ML
INJECTION, SOLUTION INTRAMUSCULAR; INTRAVENOUS
Status: DISCONTINUED | OUTPATIENT
Start: 2023-09-20 | End: 2023-09-20

## 2023-09-20 RX ORDER — ASPIRIN 325 MG
325 TABLET, DELAYED RELEASE (ENTERIC COATED) ORAL DAILY
Status: DISCONTINUED | OUTPATIENT
Start: 2023-09-20 | End: 2023-09-21 | Stop reason: HOSPADM

## 2023-09-20 RX ORDER — LIDOCAINE HYDROCHLORIDE 20 MG/ML
INJECTION, SOLUTION EPIDURAL; INFILTRATION; INTRACAUDAL; PERINEURAL
Status: DISCONTINUED | OUTPATIENT
Start: 2023-09-20 | End: 2023-09-20

## 2023-09-20 RX ORDER — HEPARIN SOD,PORCINE/0.9 % NACL 1000/500ML
INTRAVENOUS SOLUTION INTRAVENOUS
Status: DISCONTINUED | OUTPATIENT
Start: 2023-09-20 | End: 2023-09-20 | Stop reason: HOSPADM

## 2023-09-20 RX ORDER — MANNITOL 250 MG/ML
INJECTION, SOLUTION INTRAVENOUS
Status: DISCONTINUED
Start: 2023-09-20 | End: 2023-09-20 | Stop reason: WASHOUT

## 2023-09-20 RX ORDER — LIDOCAINE HYDROCHLORIDE 10 MG/ML
1 INJECTION, SOLUTION EPIDURAL; INFILTRATION; INTRACAUDAL; PERINEURAL ONCE
Status: CANCELLED | OUTPATIENT
Start: 2023-09-20 | End: 2023-09-20

## 2023-09-20 RX ORDER — HEPARIN SODIUM 1000 [USP'U]/ML
INJECTION, SOLUTION INTRAVENOUS; SUBCUTANEOUS
Status: DISCONTINUED | OUTPATIENT
Start: 2023-09-20 | End: 2023-09-20

## 2023-09-20 RX ORDER — SODIUM CHLORIDE, SODIUM LACTATE, POTASSIUM CHLORIDE, CALCIUM CHLORIDE 600; 310; 30; 20 MG/100ML; MG/100ML; MG/100ML; MG/100ML
INJECTION, SOLUTION INTRAVENOUS CONTINUOUS
Status: CANCELLED | OUTPATIENT
Start: 2023-09-20

## 2023-09-20 RX ORDER — MIDAZOLAM HYDROCHLORIDE 1 MG/ML
2 INJECTION INTRAMUSCULAR; INTRAVENOUS ONCE AS NEEDED
Status: CANCELLED | OUTPATIENT
Start: 2023-09-20 | End: 2035-02-15

## 2023-09-20 RX ADMIN — ONDANSETRON 4 MG: 2 INJECTION INTRAMUSCULAR; INTRAVENOUS at 08:09

## 2023-09-20 RX ADMIN — SUGAMMADEX 159 MG: 100 INJECTION, SOLUTION INTRAVENOUS at 08:09

## 2023-09-20 RX ADMIN — FENTANYL CITRATE 100 MCG: 50 INJECTION, SOLUTION INTRAMUSCULAR; INTRAVENOUS at 07:09

## 2023-09-20 RX ADMIN — Medication 100 MCG: at 07:09

## 2023-09-20 RX ADMIN — PHENYLEPHRINE HYDROCHLORIDE 25 MCG/MIN: 10 INJECTION INTRAVENOUS at 07:09

## 2023-09-20 RX ADMIN — ROCURONIUM BROMIDE 50 MG: 10 SOLUTION INTRAVENOUS at 07:09

## 2023-09-20 RX ADMIN — SODIUM CHLORIDE: 9 INJECTION, SOLUTION INTRAVENOUS at 11:09

## 2023-09-20 RX ADMIN — DEXAMETHASONE SODIUM PHOSPHATE 4 MG: 4 INJECTION, SOLUTION INTRA-ARTICULAR; INTRALESIONAL; INTRAMUSCULAR; INTRAVENOUS; SOFT TISSUE at 07:09

## 2023-09-20 RX ADMIN — LIDOCAINE HYDROCHLORIDE 100 MG: 20 INJECTION, SOLUTION EPIDURAL; INFILTRATION; INTRACAUDAL; PERINEURAL at 07:09

## 2023-09-20 RX ADMIN — MUPIROCIN: 20 OINTMENT TOPICAL at 09:09

## 2023-09-20 RX ADMIN — ROCURONIUM BROMIDE 20 MG: 10 SOLUTION INTRAVENOUS at 07:09

## 2023-09-20 RX ADMIN — CEFAZOLIN SODIUM 2 G: 2 SOLUTION INTRAVENOUS at 07:09

## 2023-09-20 RX ADMIN — EPHEDRINE SULFATE 10 MG: 50 INJECTION INTRAVENOUS at 07:09

## 2023-09-20 RX ADMIN — Medication 200 MCG: at 07:09

## 2023-09-20 RX ADMIN — MIDAZOLAM HYDROCHLORIDE 2 MG: 1 INJECTION, SOLUTION INTRAMUSCULAR; INTRAVENOUS at 07:09

## 2023-09-20 RX ADMIN — SODIUM CHLORIDE, SODIUM GLUCONATE, SODIUM ACETATE, POTASSIUM CHLORIDE AND MAGNESIUM CHLORIDE: 526; 502; 368; 37; 30 INJECTION, SOLUTION INTRAVENOUS at 06:09

## 2023-09-20 RX ADMIN — DOCUSATE SODIUM 100 MG: 100 CAPSULE, LIQUID FILLED ORAL at 09:09

## 2023-09-20 RX ADMIN — PROPOFOL 150 MG: 10 INJECTION, EMULSION INTRAVENOUS at 07:09

## 2023-09-20 RX ADMIN — PROTAMINE SULFATE 30 MG: 10 INJECTION, SOLUTION INTRAVENOUS at 08:09

## 2023-09-20 RX ADMIN — HEPARIN SODIUM 7000 UNITS: 1000 INJECTION, SOLUTION INTRAVENOUS; SUBCUTANEOUS at 07:09

## 2023-09-20 NOTE — ANESTHESIA PREPROCEDURE EVALUATION
09/20/2023  Mike Quintero is a 67 y.o., male with pvd, htn, B carotid art stenosis, ef 47%, followed by [facility], and other medical problems listed in the EMR      Pre-op Assessment    I have reviewed the Patient Summary Reports.     I have reviewed the Nursing Notes. I have reviewed the NPO Status.   I have reviewed the Medications.     Review of Systems      Physical Exam  General: Well nourished and Cooperative    Airway:  Mallampati: II   Mouth Opening: Normal  TM Distance: Normal  Tongue: Normal  Neck ROM: Normal ROM    Dental:  Edentulous    Chest/Lungs:  Clear to auscultation    Heart:  Rhythm: Regular Rhythm        Anesthesia Plan  Type of Anesthesia, risks & benefits discussed:    Anesthesia Type: Gen ETT  Intra-op Monitoring Plan: Standard ASA Monitors and Art Line  Post Op Pain Control Plan: multimodal analgesia  Induction:  IV  Airway Plan: Direct  Informed Consent: Informed consent signed with the Patient and all parties understand the risks and agree with anesthesia plan.  All questions answered. Patient consented to blood products? Yes  ASA Score: 4  Day of Surgery Review of History & Physical: H&P Update referred to the surgeon/provider.    Ready For Surgery From Anesthesia Perspective.     .  I explained anesthesia plan to patient/responsbile party if available.  Anesthesia consent done going over the material facts, risks, complications & alternatives, obtained which includes the possibility of altering the anesthesia plan.  I reviewed problem list, prior to admission medication list, appropriate labs, any workup, Xray, EKG etc noted below.  Patients condition is satisfactory to proceed with anesthesia plan unless otherwise noted (see anesthesia chart for details of the anesthesia plan carried out).      Pre-operative evaluation for Procedure(s) (LRB):  REPAIR-ANEURYSM-ABDOMINAL  "AORTIC-ENDOVASCULAR (AAA) (N/A)    BP (!) 145/85   Pulse 64   Temp 36.9 °C (98.4 °F) (Oral)   Resp 18   Ht 5' 10" (1.778 m)   Wt 79.7 kg (175 lb 11.3 oz)   SpO2 96%   BMI 25.21 kg/m²     Patient Active Problem List   Diagnosis    Infrarenal abdominal aortic aneurysm (AAA) without rupture       Review of patient's allergies indicates:  No Known Allergies    Current Outpatient Medications   Medication Instructions    ascorbic acid (vitamin C) (VITAMIN C) 1,000 mg, Oral, Daily    aspirin 81 mg, Oral, Daily    QUERCETIN ORAL 1 tablet, Oral, Daily    zinc sulfate (ZINC-220) 220 mg, Oral, Daily       Past Surgical History:   Procedure Laterality Date    COLONOSCOPY      LEFT HEART CATHETERIZATION Left 2023    Procedure: Left heart cath;  Surgeon: Art Mims MD;  Location: [facility] CATH LAB;  Service: Cardiology;  Laterality: Left;  LHC    WRIST SURGERY Right        Social History     Socioeconomic History    Marital status:    Tobacco Use    Smoking status: Former     Current packs/day: 0.00     Types: Cigarettes     Quit date:      Years since quittin.7    Smokeless tobacco: Never   Substance and Sexual Activity    Alcohol use: Not Currently    Drug use: Never       Lab Results   Component Value Date    WBC 3.29 (L) 2023    HGB 14.7 2023    HCT 43.6 2023    MCV 90.3 2023     2023          BMP  Lab Results   Component Value Date    HCT 43.6 2023     2023    K 4.5 2023    BUN 18.9 2023    CREATININE 0.99 2023    CALCIUM 9.5 2023        INR  No results for input(s): "PT", "INR", "PROTIME", "APTT" in the last 72 hours.        Diagnostic Studies:      EKG:  Results for orders placed or performed in visit on 23   EKG 12-lead    Collection Time: 23 12:21 PM    Narrative    Test Reason : I71.43,    Vent. Rate : 068 BPM     Atrial Rate : 068 BPM     P-R Int : 146 ms          QRS Dur : 092 ms     "  QT Int : 396 ms       P-R-T Axes : 050 048 028 degrees     QTc Int : 421 ms    Normal sinus rhythm  Nonspecific T wave abnormality  Abnormal ECG  Confirmed by Rohan LOPEZ, Siva ([id number]) on 9/15/2023 1:41:10 AM    Referred By: ANANDA NIELSEN           Confirmed By:Siva Madrigal MD

## 2023-09-20 NOTE — TRANSFER OF CARE
"Anesthesia Transfer of Care Note    Patient: Mike Quintero    Procedure(s) Performed: Procedure(s) (LRB):  REPAIR-ANEURYSM-ABDOMINAL AORTIC-ENDOVASCULAR (AAA) (Bilateral)    Patient location: PACU    Anesthesia Type: general    Transport from OR: Transported from OR on room air with adequate spontaneous ventilation    Post pain: adequate analgesia    Post assessment: no apparent anesthetic complications and tolerated procedure well    Post vital signs: stable    Level of consciousness: awake and alert    Nausea/Vomiting: no nausea/vomiting    Complications: none    Transfer of care protocol was followed      Last vitals:   Visit Vitals  /60   Pulse 65   Temp 36.9 °C (98.4 °F) (Oral)   Resp 18   Ht 5' 10" (1.778 m)   Wt 79.7 kg (175 lb 11.3 oz)   SpO2 95%   BMI 25.21 kg/m²     "

## 2023-09-20 NOTE — NURSING
Nurses Note -- 4 Eyes      9/20/2023   10:41 AM      Skin assessed during: Admit      [x] No Altered Skin Integrity Present    [x]Prevention Measures Documented      [] Yes- Altered Skin Integrity Present or Discovered   [] LDA Added if Not in Epic (Describe Wound)   [] New Altered Skin Integrity was Present on Admit and Documented in LDA   [] Wound Image Taken    Wound Care Consulted? No    Attending Nurse:  Roberto Champagne RN/Staff Member:   Cara Lynch RN

## 2023-09-20 NOTE — ANESTHESIA POSTPROCEDURE EVALUATION
Anesthesia Post Evaluation    Patient: Mike Quintero    Procedure(s) Performed: Procedure(s) (LRB):  REPAIR-ANEURYSM-ABDOMINAL AORTIC-ENDOVASCULAR (AAA) (Bilateral)    Final Anesthesia Type: general (/Regional//MAC)      Patient location during evaluation: PACU  Post-procedure mental status: @ basline.  Pain management: adequate    PONV status: See postop meds for drugs used to control n/v if any.  Anesthetic complications: no      Cardiovascular status: blood pressure returned to baseline  Respiratory status: @ baseline.  Hydration status: euvolemic            Vitals Value Taken Time   /77 09/20/23 1036   Temp 36.8 °C (98.2 °F) 09/20/23 1033   Pulse 71 09/20/23 1036   Resp 20 09/20/23 1020   SpO2 97 % 09/20/23 1036         Event Time   Out of Recovery 10:25:00         Pain/Tammy Score: Tammy Score: 10 (9/20/2023 10:25 AM)

## 2023-09-20 NOTE — ANESTHESIA PROCEDURE NOTES
Intubation    Date/Time: 9/20/2023 7:12 AM    Performed by: Martha Dillard CRNA  Authorized by: Hi James MD    Intubation:     Induction:  Intravenous    Intubated:  Postinduction    Mask Ventilation:  Easy mask    Attempts:  1    Attempted By:  Student    Method of Intubation:  Direct    Blade:  Meredith 3    Laryngeal View Grade: Grade I - full view of cords      Difficult Airway Encountered?: No      Complications:  None    Airway Device:  Oral endotracheal tube    Airway Device Size:  7.5    Style/Cuff Inflation:  Cuffed (inflated to minimal occlusive pressure)    Inflation Amount (mL):  7    Tube secured:  22    Secured at:  The lips    Placement Verified By:  Capnometry    Complicating Factors:  None    Findings Post-Intubation:  BS equal bilateral and atraumatic/condition of teeth unchanged

## 2023-09-20 NOTE — ANESTHESIA PROCEDURE NOTES
Arterial    Diagnosis: Invasive monitoring    Patient location during procedure: done out of OR  Procedure start time: 9/20/2023 6:51 AM  Timeout: 9/20/2023 6:51 AM  Procedure end time: 9/20/2023 6:58 AM    Staffing  Authorizing Provider: Hi James MD  Performing Provider: Hi James MD    Staffing  Performed by: Hi James MD  Authorized by: Hi James MD    Anesthesiologist was present at the time of the procedure.    Preanesthetic Checklist  Completed: patient identified, IV checked, site marked, risks and benefits discussed, surgical consent, monitors and equipment checked, pre-op evaluation, timeout performed and anesthesia consent givenArterial  Skin Prep: chlorhexidine gluconate and isopropyl alcohol  Local Infiltration: lidocaine (5 cc 1% Lidocaine for skin wheal)  Location: radial    Catheter size: see details below.  Catheter placement by Ultrasound guidance (US was used to visualize needle entry into the vessel.). Heme positive aspiration all ports.   Vessel Caliber: medium, patent, compressibility normal  Needle advanced into vessel with real time Ultrasound guidance.  Image recorded and saved.Insertion Attempts: 1  Assessment  Dressing: secured with tape and tegaderm  Patient: Tolerated well  Additional Notes  Arrow radial artery cath set used...20GaX1&3/4inch (4.45cm) radio-opaque polyurethane over 22Ga TW introducer needle with integral 0.018inch (0.46mm) deneen spring-wire guide used for arterial access.  Location confirmed with flashback, catheter advanced into vessel, intro needle/wire removed and luer lock connector attached to catheter.  Catheter flushed with saline, armboard secured.

## 2023-09-20 NOTE — OP NOTE
[facility] - Periop Services  General Surgery  Operative Note    SUMMARY     Date of Procedure: 9/20/2023     Procedure: Procedure(s) (LRB):  REPAIR-ANEURYSM-ABDOMINAL AORTIC-ENDOVASCULAR (AAA) (Bilateral)     Bilateral percutaneous access and closure of femoral artery for delivery of endograft    Surgeon(s) and Role:     * Blanca Reddy MD - Primary    Assisting Surgeon: None    Pre-Operative Diagnosis: Infrarenal abdominal aortic aneurysm (AAA) without rupture [I71.43]    Post-Operative Diagnosis: Post-Op Diagnosis Codes:     * Infrarenal abdominal aortic aneurysm (AAA) without rupture [I71.43]    Anesthesia: General    Operative Findings (including complications, if any):  Successful treatment infrarenal abdominal aortic aneurysm with [place] excluder device.  Percutaneous femoral access was used.  There was a type 2 endoleak at completion and we will follow-up on this with a 30 day CTA.    Description of Technical Procedures: Mr. Quintero was taken to the operating room and placed in a supine position where his abdomen groins and legs were prepped and draped in the usual sterile fashion.  B-mode ultrasound was utilized to identify the right common femoral artery it had some minimal posterior plaque but otherwise was healthy appearing.  An 18 gauge needle was utilized to cannulate the artery and a J-wire was advanced.  We then placed a 6 [place]cu[place] sheath.  Two separate ProGlide closure devices were inserted and deployed with good result.  An 8 [place]cu[place] sheath was then placed.  A similar procedure was performed on the left side.  Heparinization was performed.  Using Glidewire and Glide catheters we accessed the thoracic aorta and deployed Amplatzer wires.  An 18 [place] DrySeal [place] sheath was placed in the right femoral artery and a 12 [place] sheath was placed to the left common femoral artery.    Aortogram was then performed and the takeoff of the left renal artery and right renal artery were noted  and marked.  We then deployed our main body.  This was a [place] excluder 26 mm x 14 mm x 12 cm device.  We then utilized our pigtail catheter and a angled glide catheter and Glidewire to access the contralateral gate.  We confirmed gate cannulation by spinning of our pigtail catheter.  We then performed angiography of the left iliac system and performed sizing for our contralateral limb.  We did deploy a 14 mm x 12 cm contralateral limb.  We then performed a similar maneuver for the right iliac system.  An iliac extender was placed.  We utilized a 14 mm wide 12 cm device.  We then utilized our [place] am OB balloon for balloon angioplasty of our stent graft system.  Completion angiography was then performed demonstrating appropriate graft deployment with no evidence of type 1 endoleak.  There was a late type 2 endoleak from large size lumbar vessels.  We then removed our sheaths and wires and performed common femoral artery closure with our previously placed ProGlide devices.  We did get good closure in both femoral arteries.  4-0 Monocryl was then utilized to close the small skin incisions in each groin Dermabond dressing was placed.  There was palpable left DP pulse and a palpable right posterior tibial pulse at completion.    Significant Surgical Tasks Conducted by the Assistant(s), if Applicable: none    Estimated Blood Loss (EBL): minimal           Implants:   Implant Name Type Inv. Item Serial No.  Lot No. LRB No. Used Action   EXCLUDER 16FR [id number] 12CM - [id number]  EXCLUDER 16FR 26X14.5MM 12CM  [employer] [id number] Right 1 Implanted   GRAFT EXCLUDER [id number] - [id number]  GRAFT EXCLUDER [id number]  [employer] [id number] Left 1 Implanted   GRAFT EXCLUDER [id number] - [id number]  GRAFT EXCLUDER [id number]  [employer]  Right 1 Implanted       Specimens:   Specimen (24h ago, onward)      None                    Condition: Good    Disposition: PACU - hemodynamically stable.    Attestation: I was present  and scrubbed for the entire procedure.

## 2023-09-20 NOTE — INTERVAL H&P NOTE
The patient has been examined and the H&P has been reviewed:    I concur with the findings and no changes have occurred since H&P was written.  Palpable right PT pulse and palpable left DP pulse.  No recent health changes.  No chest pain or shortness of breath.    Surgery risks, benefits and alternative options discussed and understood by patient/family.          There are no hospital problems to display for this patient.

## 2023-09-21 VITALS
WEIGHT: 175.69 LBS | SYSTOLIC BLOOD PRESSURE: 169 MMHG | RESPIRATION RATE: 18 BRPM | BODY MASS INDEX: 25.15 KG/M2 | HEIGHT: 70 IN | TEMPERATURE: 100 F | HEART RATE: 93 BPM | OXYGEN SATURATION: 96 % | DIASTOLIC BLOOD PRESSURE: 77 MMHG

## 2023-09-21 DIAGNOSIS — Z98.890 OTHER SPECIFIED POSTPROCEDURAL STATES: ICD-10-CM

## 2023-09-21 DIAGNOSIS — I71.43 INFRARENAL ABDOMINAL AORTIC ANEURYSM (AAA) WITHOUT RUPTURE: Primary | ICD-10-CM

## 2023-09-21 PROCEDURE — 94761 N-INVAS EAR/PLS OXIMETRY MLT: CPT

## 2023-09-21 PROCEDURE — 25000003 PHARM REV CODE 250: Performed by: SPECIALIST

## 2023-09-21 RX ADMIN — SODIUM CHLORIDE: 9 INJECTION, SOLUTION INTRAVENOUS at 12:09

## 2023-09-21 RX ADMIN — MUPIROCIN: 20 OINTMENT TOPICAL at 08:09

## 2023-09-21 NOTE — DISCHARGE SUMMARY
[facility]  [facility]charge Note  Short Stay    Procedure(s) (LRB):  REPAIR-ANEURYSM-ABDOMINAL AORTIC-ENDOVASCULAR (AAA) (Bilateral)      OUTCOME: Patient tolerated treatment/procedure well without complication and is now ready for discharge.  Underwent successful ENDOVASCULAR AORTIC ANEURYSM REPAIR with percutaneous access. Discharged on POD#1.     DISPOSITION: Home or Self Care    FINAL DIAGNOSIS:  Infrarenal abdominal aortic aneurysm (AAA) without rupture    FOLLOWUP: In clinic with CTA abdomen and pelvis    DISCHARGE INSTRUCTIONS:    Discharge Procedure Orders   Diet Adult Regular     No driving until:   Order Comments: For 7 days     No dressing needed   Scheduling Instructions: Ok to shower. Cleans wound area with antibacterial soap and pat dry.  Leave dermabond in place (will fall off when appropriate).     Notify your health care provider if you experience any of the following:  severe uncontrolled pain     Notify your health care provider if you experience any of the following:  severe persistent headache     Notify your health care provider if you experience any of the following:  persistent dizziness, light-headedness, or visual disturbances     Notify your health care provider if you experience any of the following:  increased confusion or weakness     Activity as tolerated        TIME SPENT ON DISCHARGE: 5 minutes

## 2023-09-21 NOTE — PLAN OF CARE
Cm visited patient and wife at bedside. They are ready for discharge.  Pt is independent and has no d/c needs.  Uses [facility] in [place] on [address].     Pt has medicare A as a secondary. Placed copy on chart.      09/21/23 0634   Discharge Assessment   Assessment Type Discharge Planning Assessment   Confirmed/corrected address, phone number and insurance Yes   Confirmed Demographics Correct on Facesheet   Source of Information patient;family   When was your last doctors appointment?   (unknown)   Communicated MONSE with patient/caregiver Yes   Reason For Admission AAA repair   People in Home spouse   Facility Arrived From: home   Do you expect to return to your current living situation? Yes   Do you have help at home or someone to help you manage your care at home? No  (N/A)   Prior to hospitilization cognitive status: Alert/Oriented;No Deficits   Current cognitive status: Alert/Oriented;No Deficits   Home Layout Able to live on 1st floor   Equipment Currently Used at Home none   Readmission within 30 days? No   Patient currently being followed by outpatient case management? No   Do you currently have service(s) that help you manage your care at home? No   Do you take prescription medications? Yes   Do you have prescription coverage? Yes   Coverage [employer] commercial and medicare A   Do you have any problems affording any of your prescribed medications? No  (denies- says his insurance covers his meds well.)   Is the patient taking medications as prescribed? yes   Who is going to help you get home at discharge? Spouse   How do you get to doctors appointments? car, drives self   Are you on dialysis? No   Do you take coumadin? No   DME Needed Upon Discharge  none   Discharge Plan discussed with: Spouse/sig other;Patient   Transition of Care Barriers None   Discharge Plan A Home   Financial Resource Strain   How hard is it for you to pay for the very basics like food, housing, medical care, and heating? Not hard    Housing Stability   In the last 12 months, was there a time when you were not able to pay the mortgage or rent on time? N   In the last 12 months, was there a time when you did not have a steady place to sleep or slept in a shelter (including now)? N   Transportation Needs   In the past 12 months, has lack of transportation kept you from medical appointments or from getting medications? no   In the past 12 months, has lack of transportation kept you from meetings, work, or from getting things needed for daily living? No   Food Insecurity   Within the past 12 months, you worried that your food would run out before you got the money to buy more. Never true   Within the past 12 months, the food you bought just didn't last and you didn't have money to get more. Never true   Social Connections   Are you , , , , never , or living with a partner?

## 2023-09-21 NOTE — PROGRESS NOTES
[facility] - [facility] Medical Telemetry  Vascular Surgery  Progress Note    Patient Name: Mike Quintero  MRN: [id number]  Admission Date: 9/20/2023  Primary Care Provider: Srinivas Moore MD    Subjective:     Interval History: No acute events;  little to no pain; ambulating without difficulty. Lerner removed.    Post-Op Info:  Procedure(s) (LRB):  REPAIR-ANEURYSM-ABDOMINAL AORTIC-ENDOVASCULAR (AAA) (Bilateral)   1 Day Post-Op      Medications:  Continuous Infusions:   sodium chloride 0.9% 75 mL/hr at 09/21/23 0704     Scheduled Meds:   aspirin  325 mg Oral Daily    atorvastatin  40 mg Oral Daily    docusate sodium  100 mg Oral BID    mupirocin   Nasal BID     PRN Meds:HYDROcodone-acetaminophen, HYDROmorphone, ondansetron, ondansetron     Objective:     Vital Signs (Most Recent):  Temp: 99.6 °F (37.6 °C) (09/21/23 1115)  Pulse: 93 (09/21/23 1115)  Resp: 18 (09/21/23 1115)  BP: (!) 169/77 (09/21/23 1115)  SpO2: 96 % (09/21/23 1115) Vital Signs (24h Range):  Temp:  [98 °F (36.7 °C)-99.6 °F (37.6 °C)] 99.6 °F (37.6 °C)  Pulse:  [] 93  Resp:  [18] 18  SpO2:  [94 %-97 %] 96 %  BP: (133-169)/(73-80) 169/77     Date 09/21/23 0700 - 09/22/23 0659   Shift 5888-1173 7569-8033 1854-9960 24 Hour Total   INTAKE   I.V.(mL/kg) 944.7(11.9)   944.7(11.9)   Shift Total(mL/kg) 944.7(11.9)   944.7(11.9)   OUTPUT   Shift Total(mL/kg)       Weight (kg) 79.7 79.7 79.7 79.7       Physical Exam    Alert, comfortable appearing  Breathing easily  Abdomen soft  Bilateral groins soft without swelling  Palpable right PT pulse   Palpable left DP pulse      Assessment/Plan:     There are no hospital problems to display for this patient.    POD#1 s/p ENDOVASCULAR AORTIC ANEURYSM REPAIR for abdominal aortic aneurysm    Discharge home today  CTA abd/pelv in 4 weeks with follow up  Type II endoleak at completion.  Discussed at length with patient and wife.  Will evaluate with follow CTA.     Blanca Reddy MD  Vascular  Surgery  [facility] - [address] [place] [address]

## 2023-09-21 NOTE — NURSING
Patient has received all discharge instructions regarding follow up appointments, and discharge instructions. IV and telemetry has been discontinued. Patient is being discharged home, and is stable at this time.

## 2023-09-23 ENCOUNTER — PATIENT MESSAGE (OUTPATIENT)
Dept: ADMINISTRATIVE | Facility: OTHER | Age: 68
End: 2023-09-23
Payer: COMMERCIAL

## 2023-09-24 ENCOUNTER — PATIENT MESSAGE (OUTPATIENT)
Dept: ADMINISTRATIVE | Facility: OTHER | Age: 68
End: 2023-09-24
Payer: COMMERCIAL

## 2023-09-24 LAB
ABO + RH BLD: NORMAL
ABO + RH BLD: NORMAL
BLD PROD TYP BPU: NORMAL
BLD PROD TYP BPU: NORMAL
BLOOD UNIT EXPIRATION DATE: NORMAL
BLOOD UNIT EXPIRATION DATE: NORMAL
BLOOD UNIT TYPE CODE: 6200
BLOOD UNIT TYPE CODE: 6200
CROSSMATCH INTERPRETATION: NORMAL
CROSSMATCH INTERPRETATION: NORMAL
DISPENSE STATUS: NORMAL
DISPENSE STATUS: NORMAL
UNIT NUMBER: NORMAL
UNIT NUMBER: NORMAL

## 2023-10-16 NOTE — PROGRESS NOTES
"    [facility] Vascular - Clinic Note  Blanca Reddy MD      Patient Name: Mike Quintero                   : 1955      MRN: [id number]   Visit Date: 10/19/2023       History Present Illness     Reason for Visit: Abdominal Aortic Aneurysm and Post-operative Follow up     Mr. Quintero presents to the clinic for 1 month follow up s/p endovascular aortic aneurysm repair 23. CTA obtained 10/17/23. He denies acute onset of abdominal or back pain. He denies fevers.  He also denies discomfort, worsening pain or swelling at the groin access sites. He reports ambulating without leg or buttock pain.       REVIEW OF SYSTEMS:  ROS  12 point review of systems conducted, negative except as stated in the history of present illness. See HPI for details.        Physical Exam      Vitals:    10/19/23 0908 10/19/23 0909   BP: (!) 154/84 (!) 144/87   BP Location: Right arm Left arm   Pulse: 79 77   Weight:  72.6 kg (160 lb)   Height:  5' 10" (1.778 m)          General: well-nourished, no acute distress, and healthy appearing, ambulating normally, alert, pleasant, conversant, and oriented  Neurologic: cranial nerves are grossly intact, no neurologic deficits  HEENT: grossly normal and no scleral icterus  Neck/Chest: normal , soft without lymphadenopathy, and palpable carotid pulses bilaterally  Respiratory: breathing easily and without respiratory distress  Abdomen: normal, soft, and palpable pulsatile mass  Cardiology: regular rate and rhythm    Upper Extremity Arterial Exam:   Right - radial is palpable  Left - radial is palpable    Lower Extremity Arterial Exam:  Right - posterior tibial is palpable and dorsalis pedis is non-palpable  Left - posterior tibial is palpable and dorsalis pedis is palpable    Musculoskeletal:   Lower Extremity: no edema present to bilateral lower extremities    Post Operative Incision:   Location: bilateral groins  clean, dry, and healed appropriately   No tenderness or fullness " appreciated           Assessment and Plan     Mr. Quintero is a 67 y.o. man who is s/p endovascular aneurysm repair of an abdominal aortic aneurysm on 9/20/23. He is doing well symptomatically with well-healed incisions.  Post-operative CTA abdomen/pelvis obtained 10/17/23, which I personally reviewed reveals the endograft to be patent and without evidence of endoleak. Aneurysm measures 5.3 cm, which is slightly smaller in size compared to preoperative measurement of 5.5 cm. There is no evidence of a definitive endoleak despite my initial postoperative concerns of a Type II endoleak from the lumbars.  Moreover the aneurysm size decrease is reassuring.  Recommend aneurysm surveillance duplex of the aorta in 6 months.         1. Infrarenal abdominal aortic aneurysm (AAA) without rupture  - CV Abdominal Aorta; Future          Imaging Obtained/Reviewed   Study: CTA abdomen/pelvis  Date:   10/17/23        Medical History     Past Medical History:   Diagnosis Date    Abnormal nuclear stress test     Arthritis     Carotid artery stenosis     Bilateral    Coronary artery disease     Dyslipidemia     Hypertension     not on medications at this time    Infrarenal abdominal aortic aneurysm (AAA) without rupture     Larynx cancer 2010     Past Surgical History:   Procedure Laterality Date    ABDOMINAL AORTIC ANEURYSM REPAIR, ENDOVASCULAR Bilateral 9/20/2023    Procedure: REPAIR-ANEURYSM-ABDOMINAL AORTIC-ENDOVASCULAR (AAA);  Surgeon: Blanca Reddy MD;  Location: [facility] OR;  Service: Peripheral Vascular;  Laterality: Bilateral;  Room 12    COLONOSCOPY      LEFT HEART CATHETERIZATION Left 8/24/2023    Procedure: Left heart cath;  Surgeon: Art Mims MD;  Location: [facility] CATH LAB;  Service: Cardiology;  Laterality: Left;  C    WRIST SURGERY Right      Family History   Family history unknown: Yes     Social History     Socioeconomic History    Marital status:    Tobacco Use    Smoking status: Former     Current  packs/day: 0.00     Types: Cigarettes     Quit date:      Years since quittin.8    Smokeless tobacco: Never   Substance and Sexual Activity    Alcohol use: Not Currently    Drug use: Never     Social Determinants of Health     Financial Resource Strain: Low Risk  (2023)    Overall Financial Resource Strain (CARDIA)     Difficulty of Paying Living Expenses: Not hard at all   Food Insecurity: No Food Insecurity (2023)    Hunger Vital Sign     Worried About Running Out of Food in the Last Year: Never true     Ran Out of Food in the Last Year: Never true   Transportation Needs: No Transportation Needs (2023)    PRAPARE - Transportation     Lack of Transportation (Medical): No     Lack of Transportation (Non-Medical): No   Social Connections: Unknown (2023)    Social Connection and Isolation Panel [NHANES]     Marital Status:    Housing Stability: Unknown (2023)    Housing Stability Vital Sign     Unable to Pay for Housing in the Last Year: No     Unstable Housing in the Last Year: No     Current Outpatient Medications   Medication Instructions    ascorbic acid (vitamin C) (VITAMIN C) 1,000 mg, Oral, Daily    aspirin 81 mg, Oral, Daily    QUERCETIN ORAL 1 tablet, Oral, Daily    zinc sulfate (ZINC-220) 220 mg, Oral, Daily     Review of patient's allergies indicates:  No Known Allergies    Patient Care Team:  Srinivas Moore MD as PCP - General (Family Medicine)  Art Mims MD as Consulting Physician (Cardiovascular Disease)        No follow-ups on file. In addition to their scheduled follow up, the patient has also been instructed to follow up on as needed basis.     No future appointments.

## 2023-10-19 ENCOUNTER — OFFICE VISIT (OUTPATIENT)
Dept: VASCULAR SURGERY | Facility: CLINIC | Age: 68
End: 2023-10-19
Payer: COMMERCIAL

## 2023-10-19 VITALS
SYSTOLIC BLOOD PRESSURE: 144 MMHG | HEIGHT: 70 IN | BODY MASS INDEX: 22.9 KG/M2 | WEIGHT: 160 LBS | DIASTOLIC BLOOD PRESSURE: 87 MMHG | HEART RATE: 77 BPM

## 2023-10-19 DIAGNOSIS — I71.43 INFRARENAL ABDOMINAL AORTIC ANEURYSM (AAA) WITHOUT RUPTURE: Primary | ICD-10-CM

## 2023-10-19 PROCEDURE — 1159F MED LIST DOCD IN RCRD: CPT | Mod: CPTII,,, | Performed by: SPECIALIST

## 2023-10-19 PROCEDURE — 1160F RVW MEDS BY RX/DR IN RCRD: CPT | Mod: CPTII,,, | Performed by: SPECIALIST

## 2023-10-19 PROCEDURE — 99024 PR POST-OP FOLLOW-UP VISIT: ICD-10-PCS | Mod: ,,, | Performed by: SPECIALIST

## 2023-10-19 PROCEDURE — 3079F DIAST BP 80-89 MM HG: CPT | Mod: CPTII,,, | Performed by: SPECIALIST

## 2023-10-19 PROCEDURE — 1101F PR PT FALLS ASSESS DOC 0-1 FALLS W/OUT INJ PAST YR: ICD-10-PCS | Mod: CPTII,,, | Performed by: SPECIALIST

## 2023-10-19 PROCEDURE — 3288F PR FALLS RISK ASSESSMENT DOCUMENTED: ICD-10-PCS | Mod: CPTII,,, | Performed by: SPECIALIST

## 2023-10-19 PROCEDURE — 3077F PR MOST RECENT SYSTOLIC BLOOD PRESSURE >= 140 MM HG: ICD-10-PCS | Mod: CPTII,,, | Performed by: SPECIALIST

## 2023-10-19 PROCEDURE — 1101F PT FALLS ASSESS-DOCD LE1/YR: CPT | Mod: CPTII,,, | Performed by: SPECIALIST

## 2023-10-19 PROCEDURE — 3077F SYST BP >= 140 MM HG: CPT | Mod: CPTII,,, | Performed by: SPECIALIST

## 2023-10-19 PROCEDURE — 3079F PR MOST RECENT DIASTOLIC BLOOD PRESSURE 80-89 MM HG: ICD-10-PCS | Mod: CPTII,,, | Performed by: SPECIALIST

## 2023-10-19 PROCEDURE — 3288F FALL RISK ASSESSMENT DOCD: CPT | Mod: CPTII,,, | Performed by: SPECIALIST

## 2023-10-19 PROCEDURE — 1160F PR REVIEW ALL MEDS BY PRESCRIBER/CLIN PHARMACIST DOCUMENTED: ICD-10-PCS | Mod: CPTII,,, | Performed by: SPECIALIST

## 2023-10-19 PROCEDURE — 1159F PR MEDICATION LIST DOCUMENTED IN MEDICAL RECORD: ICD-10-PCS | Mod: CPTII,,, | Performed by: SPECIALIST

## 2023-10-19 PROCEDURE — 99024 POSTOP FOLLOW-UP VISIT: CPT | Mod: ,,, | Performed by: SPECIALIST

## 2024-04-18 NOTE — PROGRESS NOTES
[facility] - Clinic Note  Blanca Reddy MD      Patient Name: Mike Quintero                   : 1955      MRN: [id number]   Visit Date: 2024       History Present Illness     Reason for Visit: Abdominal Aortic Aneurysm    Mr. Quintero presents to the clinic for 6 month aneurysm surveillance s/p endovascular aneurysm repair 23. Aorta duplex obtained today. He denies acute onset of abdominal or back pain. He denies recent heart issues.   He did fall off of his roof approximately 5 weeks ago and suffered an orthopedic injury to his right lower leg and fractures to his back.  He is seeing Dr. Brooks for his back and he is in the cast for his right lower leg.      REVIEW OF SYSTEMS:  ROS  12 point review of systems conducted, negative except as stated in the history of present illness. See HPI for details.        Physical Exam      There were no vitals filed for this visit.       General: well-nourished, no acute distress, and healthy appearing, ambulating with a walker, alert, pleasant, conversant, and oriented  Neurologic: cranial nerves are grossly intact, no neurologic deficits, no motor deficits, and no sensory deficits  HEENT: grossly normal and no scleral icterus  Neck/Chest: normal  and soft without lymphadenopathy  Respiratory: breathing easily and without respiratory distress  Abdomen: normal and soft; no palpable pulsatile mass;  lumbar spine brace is in place  Cardiology: regular rate and rhythm    Upper Extremity Arterial Exam:   Right - radial is palpable  Left - radial is palpable    Lower Extremity Arterial Exam:  Left - posterior tibial is palpable  Unable to assess right foot pulse s/t cast in place    Musculoskeletal:   Upper Extremity: No deficits;  Lower Extremity: no edema present to bilateral lower extremities ;  Right lower leg cast in place    Skin: has no obvious skin abnormality to upper extremities and lower extremities              Assessment and Plan       Leon is a 68 y.o. with an abdominal aortic aneurysm.  He  is s/p Endovascular Aneurysm Repair on 2023. He denies acute back or abdominal pain. His previous CTA abdomen/pelvis obtained 2024 demonstrates a  5.3 cm AAA. Duplex obtained today reveals aneurysm has decreased and measures approximately 4.4 cm with no evidence of endoleak.  Recommend 6 months follow up with abdominal duplex.          There are no diagnoses linked to this encounter.        Imaging Obtained/Reviewed   Study: aorta duplex  Date:   2024        Medical History     Past Medical History:   Diagnosis Date    AAA (abdominal aortic aneurysm)     Abnormal nuclear stress test     Arthritis     Carotid artery stenosis     Bilateral    Coronary artery disease     Dyslipidemia     GERD (gastroesophageal reflux disease)     Hypertension     not on medications at this time    Infrarenal abdominal aortic aneurysm (AAA) without rupture     Larynx cancer      Past Surgical History:   Procedure Laterality Date    ABDOMINAL AORTIC ANEURYSM REPAIR, ENDOVASCULAR Bilateral 2023    Procedure: REPAIR-ANEURYSM-ABDOMINAL AORTIC-ENDOVASCULAR (AAA);  Surgeon: Blanca Reddy MD;  Location: [facility] OR;  Service: Peripheral Vascular;  Laterality: Bilateral;  Room 12    COLONOSCOPY      LEFT HEART CATHETERIZATION Left 2023    Procedure: Left heart cath;  Surgeon: Art Mims MD;  Location: [facility] CATH LAB;  Service: Cardiology;  Laterality: Left;  LHC    WRIST SURGERY Right      Family History   Family history unknown: Yes     Social History     Socioeconomic History    Marital status:    Tobacco Use    Smoking status: Former     Current packs/day: 0.00     Types: Cigarettes     Quit date:      Years since quittin.3    Smokeless tobacco: Never   Substance and Sexual Activity    Alcohol use: Not Currently    Drug use: Never     Social Determinants of Health     Financial Resource Strain: Low Risk  (2023)    Overall  Financial Resource Strain (CARDIA)     Difficulty of Paying Living Expenses: Not hard at all   Food Insecurity: No Food Insecurity (9/21/2023)    Hunger Vital Sign     Worried About Running Out of Food in the Last Year: Never true     Ran Out of Food in the Last Year: Never true   Transportation Needs: No Transportation Needs (9/21/2023)    PRAPARE - Transportation     Lack of Transportation (Medical): No     Lack of Transportation (Non-Medical): No   Social Connections: Unknown (9/21/2023)    Social Connection and Isolation Panel [NHANES]     Marital Status:    Housing Stability: Unknown (9/21/2023)    Housing Stability Vital Sign     Unable to Pay for Housing in the Last Year: No     Unstable Housing in the Last Year: No     Current Outpatient Medications   Medication Instructions    ascorbic acid (vitamin C) (VITAMIN C) 1,000 mg, Oral, Daily    aspirin 81 mg, Oral, Daily    QUERCETIN ORAL 1 tablet, Oral, Daily    zinc sulfate (ZINC-220) 220 mg, Oral, Daily     Review of patient's allergies indicates:  No Known Allergies    Patient Care Team:  Srinivas Moore MD as PCP - General (Family Medicine)  Art Mims MD as Consulting Physician (Cardiovascular Disease)        No follow-ups on file. In addition to their scheduled follow up, the patient has also been instructed to follow up on as needed basis.     Future Appointments   Date Time Provider Department Center   4/23/2024  7:45 AM [facility] VASCULAR SURGERY  [address] [facility]   4/23/2024  8:00 AM Blanca Reddy MD [facility]

## 2024-04-23 ENCOUNTER — OFFICE VISIT (OUTPATIENT)
Dept: VASCULAR SURGERY | Facility: CLINIC | Age: 69
End: 2024-04-23
Attending: SPECIALIST
Payer: COMMERCIAL

## 2024-04-23 VITALS
SYSTOLIC BLOOD PRESSURE: 131 MMHG | BODY MASS INDEX: 23.62 KG/M2 | DIASTOLIC BLOOD PRESSURE: 82 MMHG | HEART RATE: 79 BPM | WEIGHT: 165 LBS | HEIGHT: 70 IN

## 2024-04-23 DIAGNOSIS — I71.43 INFRARENAL ABDOMINAL AORTIC ANEURYSM (AAA) WITHOUT RUPTURE: Primary | ICD-10-CM

## 2024-04-23 PROCEDURE — 1101F PT FALLS ASSESS-DOCD LE1/YR: CPT | Mod: CPTII,,, | Performed by: SPECIALIST

## 2024-04-23 PROCEDURE — 3288F FALL RISK ASSESSMENT DOCD: CPT | Mod: CPTII,,, | Performed by: SPECIALIST

## 2024-04-23 PROCEDURE — 3008F BODY MASS INDEX DOCD: CPT | Mod: CPTII,,, | Performed by: SPECIALIST

## 2024-04-23 PROCEDURE — 1159F MED LIST DOCD IN RCRD: CPT | Mod: CPTII,,, | Performed by: SPECIALIST

## 2024-04-23 PROCEDURE — 99213 OFFICE O/P EST LOW 20 MIN: CPT | Mod: ,,, | Performed by: SPECIALIST

## 2024-04-23 PROCEDURE — 1160F RVW MEDS BY RX/DR IN RCRD: CPT | Mod: CPTII,,, | Performed by: SPECIALIST

## 2024-04-23 PROCEDURE — 3075F SYST BP GE 130 - 139MM HG: CPT | Mod: CPTII,,, | Performed by: SPECIALIST

## 2024-04-23 PROCEDURE — 3079F DIAST BP 80-89 MM HG: CPT | Mod: CPTII,,, | Performed by: SPECIALIST

## 2024-10-17 RX ORDER — PHENYLEPHRINE HCL 10 MG
1000 TABLET ORAL
COMMUNITY

## 2024-10-17 RX ORDER — SULFAMETHOXAZOLE AND TRIMETHOPRIM 800; 160 MG/1; MG/1
1 TABLET ORAL 2 TIMES DAILY
COMMUNITY
Start: 2024-04-25 | End: 2024-10-29

## 2024-10-29 ENCOUNTER — OFFICE VISIT (OUTPATIENT)
Dept: VASCULAR SURGERY | Facility: CLINIC | Age: 69
End: 2024-10-29
Attending: SPECIALIST
Payer: COMMERCIAL

## 2024-10-29 VITALS
WEIGHT: 160 LBS | HEIGHT: 70 IN | SYSTOLIC BLOOD PRESSURE: 128 MMHG | DIASTOLIC BLOOD PRESSURE: 87 MMHG | BODY MASS INDEX: 22.9 KG/M2 | HEART RATE: 70 BPM

## 2024-10-29 DIAGNOSIS — I71.43 INFRARENAL ABDOMINAL AORTIC ANEURYSM (AAA) WITHOUT RUPTURE: Primary | ICD-10-CM

## 2024-10-29 DIAGNOSIS — Z86.79 S/P ENDOVASCULAR ANEURYSM REPAIR: ICD-10-CM

## 2024-10-29 DIAGNOSIS — Z98.890 S/P ENDOVASCULAR ANEURYSM REPAIR: ICD-10-CM

## 2024-10-29 PROCEDURE — 3288F FALL RISK ASSESSMENT DOCD: CPT | Mod: CPTII,,, | Performed by: SPECIALIST

## 2024-10-29 PROCEDURE — 99213 OFFICE O/P EST LOW 20 MIN: CPT | Mod: ,,, | Performed by: SPECIALIST

## 2024-10-29 PROCEDURE — 1159F MED LIST DOCD IN RCRD: CPT | Mod: CPTII,,, | Performed by: SPECIALIST

## 2024-10-29 PROCEDURE — 1160F RVW MEDS BY RX/DR IN RCRD: CPT | Mod: CPTII,,, | Performed by: SPECIALIST

## 2024-10-29 PROCEDURE — 3074F SYST BP LT 130 MM HG: CPT | Mod: CPTII,,, | Performed by: SPECIALIST

## 2024-10-29 PROCEDURE — 3079F DIAST BP 80-89 MM HG: CPT | Mod: CPTII,,, | Performed by: SPECIALIST

## 2024-10-29 PROCEDURE — 3008F BODY MASS INDEX DOCD: CPT | Mod: CPTII,,, | Performed by: SPECIALIST

## 2024-10-29 PROCEDURE — 1100F PTFALLS ASSESS-DOCD GE2>/YR: CPT | Mod: CPTII,,, | Performed by: SPECIALIST

## (undated) DEVICE — PAD DEFIB CADENCE ADULT R2

## (undated) DEVICE — Device

## (undated) DEVICE — CATH IMPULSE 5F 100CM FR4

## (undated) DEVICE — COVER PROBE US 5.5X58L NON LTX

## (undated) DEVICE — CATH SUP TORQ PGTL 5FR 65CM

## (undated) DEVICE — FLEXSHEATH DRYSEAL 12FR 33CM

## (undated) DEVICE — GUIDEWIRE STF .035X180CM ANG

## (undated) DEVICE — SPONGE LAP STRL 18X18IN

## (undated) DEVICE — KIT SYR REUSABLE

## (undated) DEVICE — BAG MEDI-PLAST DECANTER C-FLOW

## (undated) DEVICE — DRESSING TELFA + BARR 4X6IN

## (undated) DEVICE — SYR ONLY LUER LOCK 20CC

## (undated) DEVICE — INTRODUCER CATH 6F 11CM

## (undated) DEVICE — GUIDEWIRE INQWIRE SE 3MM JTIP

## (undated) DEVICE — DRAPE C-ARM COVER EZ 36X28IN

## (undated) DEVICE — APPLICATOR CHLORAPREP ORN 26ML

## (undated) DEVICE — BLLN MOLDG OCCL 10-37X4X90

## (undated) DEVICE — HOLDER STRIP-T SELF ADH 2X10IN

## (undated) DEVICE — GLOVE PROTEXIS HYDROGEL SZ6.5

## (undated) DEVICE — KIT HAND CONTROL HIGH PRESSUR

## (undated) DEVICE — SHEATH DRYSEAL 18FR 33CM

## (undated) DEVICE — SOL NORMAL USPCA 0.9%

## (undated) DEVICE — TRAY CATH FOL SIL URIMTR 16FR

## (undated) DEVICE — KIT SURGICAL TURNOVER

## (undated) DEVICE — SYR 50CC LL

## (undated) DEVICE — DEVICE PERCLOSE SUT CLSR 6FR

## (undated) DEVICE — ADHESIVE DERMABOND ADVANCED

## (undated) DEVICE — CATH GLIDE ANGLED 5FR 65CM

## (undated) DEVICE — GUIDEWIRE STF .035X260CM ANG

## (undated) DEVICE — NDL PERC ENTRY BSDN 18-7.0

## (undated) DEVICE — GUIDEWIRE AMPLATZ 180 46-525

## (undated) DEVICE — CANNULA ADULT NASAL 7FT

## (undated) DEVICE — BAND TR COMP DEVICE REG 24CM

## (undated) DEVICE — DRAPE INCISE IOBAN 2 23X23IN

## (undated) DEVICE — CATH ANGIO SPR TRQ HYDRO CT 5F

## (undated) DEVICE — ELECTRODE PATIENT RETURN DISP

## (undated) DEVICE — INTRODUCER CATH 8F 11CM

## (undated) DEVICE — SHEET DRAPE MEDIUM

## (undated) DEVICE — SHEET XLGE DRAPE

## (undated) DEVICE — BOWL STERILE LARGE 32OZ

## (undated) DEVICE — KIT MANIFOLD LOW PRESS TUBING

## (undated) DEVICE — DEVICE BASIXCOMPAK INFL 20ML